# Patient Record
(demographics unavailable — no encounter records)

---

## 2024-10-18 NOTE — PHYSICAL EXAM
[No Acute Distress] : no acute distress [Normal Oropharynx] : normal oropharynx [Normal Appearance] : normal appearance [No Neck Mass] : no neck mass [Normal Rate/Rhythm] : normal rate/rhythm [Normal S1, S2] : normal s1, s2 [No Murmurs] : no murmurs [No Resp Distress] : no resp distress [Wheeze] : wheeze [No Abnormalities] : no abnormalities [Benign] : benign [Normal Gait] : normal gait [No Clubbing] : no clubbing [No Cyanosis] : no cyanosis [No Edema] : no edema [FROM] : FROM [Normal Color/ Pigmentation] : normal color/ pigmentation [No Focal Deficits] : no focal deficits [Oriented x3] : oriented x3 [Normal Affect] : normal affect [TextBox_68] : Fair aeration with diffuse wheezing all areas

## 2024-10-18 NOTE — DISCUSSION/SUMMARY
[FreeTextEntry1] : Mr. Walton is here for a preoperative pulmonary evaluation for gastric bypass surgery.  He was found to have severe obstructive sleep apnea and is using his machine appropriately and feels excellent.  He denies any further snoring feels awake and alert all day.  His AHI has decreased from 80-3.5.  We will continue APAP therapy.  Patient continues to smoke.  He has some wheezing on exam today.  I am going to start Trelegy 100 mg 1 spray daily.  I am going to asked him to come back in 3 weeks for reevaluation before major surgery. The patient understands and agrees with plan of care. Today's office visit encompassed 32 minutes which excludes teaching and separately reported services.. I conducted an extensive history, physical exam and reviewed diagnosis and treatment options including diagnostic tests,radiology studies including cat scans and the use of prescription medication.

## 2024-10-18 NOTE — PROCEDURE
[FreeTextEntry1] : Compliance report 8/19/2024 to 10/17/2024 100% of days used.  98% of days greater than 4 hours.  AHI 3.5.  This is excellent compliance.  Spirometry performed 10/18/2024 no obstructive or restrictive disease.

## 2024-10-18 NOTE — HISTORY OF PRESENT ILLNESS
[Current] : current [Never] : never [Obstructive Sleep Apnea] : obstructive sleep apnea [CPAP:] : CPAP [Nasal pillow] : nasal pillow [TextBox_4] : 61 male hx  obstructive sleep apnea  pt is planned for gastric bypass  Norbert Miranda  today feels good  no longer napping and on cpap  and feels much better  using 4 cigarettes per day  no sob no cough no wheeze no chest pain no tightness full activity no limitations [TextBox_11] : 0.50 [TextBox_13] : 17 [TextBox_158] : [employer]

## 2024-11-08 NOTE — DISCUSSION/SUMMARY
[FreeTextEntry1] : Mr. Walotn has obstructive sleep apnea and COPD.  He is here for preop pulm evaluation for bariatric surgery.  He is using the Trelegy every day and his lungs are clear and he is feeling good.  The patient is using the APAP machine appropriately.  He no longer uses tobacco for the last 11 days.  I will we will continue all therapy.. Pulmonary status stable for planned anesthesia and surgery.  The patient understands and agrees with plan of care. Today's office visit encompassed 32 minutes which excludes teaching and separately reported services.. I conducted an extensive history, physical exam and reviewed diagnosis and treatment options including diagnostic tests,radiology studies including cat scans and the use of prescription medication.

## 2024-11-08 NOTE — HISTORY OF PRESENT ILLNESS
[Former] : former [Never] : never [Obstructive Sleep Apnea] : obstructive sleep apnea [CPAP:] : CPAP [Nasal pillow] : nasal pillow [TextBox_4] : 61 male + tobacco but none for 11 d for  preoperative evaluation for bariatric surgery and last visit  noted wheeze started on trelegy 1 qd feels good today  no sob no cough no wheeze  full activity  using cpap nitely no snoring  and rested in am [TextBox_11] : .50 [TextBox_13] : 17 [YearQuit] : 2024 [TextBox_158] : [employer]

## 2024-11-08 NOTE — REASON FOR VISIT
[Follow-Up] : a follow-up visit [Sleep Apnea] : sleep apnea [TextEntry] : Pt here for 3 week follow-up, needs an updated medical clearance for his bariatric surgery with Dr. Blackmon, date TBD (possibly December). Blood pressure changed from 134/80 last time to 134/70 since quitting smoking, PT wanted me to note this.